# Patient Record
Sex: MALE | Race: WHITE | Employment: FULL TIME | URBAN - NONMETROPOLITAN AREA
[De-identification: names, ages, dates, MRNs, and addresses within clinical notes are randomized per-mention and may not be internally consistent; named-entity substitution may affect disease eponyms.]

---

## 2020-01-03 ENCOUNTER — HOSPITAL ENCOUNTER (EMERGENCY)
Age: 45
Discharge: HOME | End: 2020-01-03
Payer: COMMERCIAL

## 2020-01-03 VITALS
RESPIRATION RATE: 16 BRPM | HEART RATE: 100 BPM | TEMPERATURE: 98.6 F | SYSTOLIC BLOOD PRESSURE: 135 MMHG | OXYGEN SATURATION: 97 % | WEIGHT: 269 LBS | BODY MASS INDEX: 35.65 KG/M2 | HEIGHT: 73 IN | DIASTOLIC BLOOD PRESSURE: 85 MMHG

## 2020-01-03 PROCEDURE — 99203 OFFICE O/P NEW LOW 30 MIN: CPT | Performed by: NURSE PRACTITIONER

## 2020-01-03 PROCEDURE — 99202 OFFICE O/P NEW SF 15 MIN: CPT

## 2020-01-03 RX ORDER — PREDNISONE 20 MG/1
40 TABLET ORAL DAILY
Qty: 10 TABLET | Refills: 0 | Status: SHIPPED | OUTPATIENT
Start: 2020-01-03 | End: 2020-01-08

## 2020-01-03 RX ORDER — AMOXICILLIN AND CLAVULANATE POTASSIUM 875; 125 MG/1; MG/1
1 TABLET, FILM COATED ORAL 2 TIMES DAILY
Qty: 14 TABLET | Refills: 0 | Status: SHIPPED | OUTPATIENT
Start: 2020-01-03 | End: 2020-01-10

## 2020-01-03 ASSESSMENT — PAIN SCALES - GENERAL: PAINLEVEL_OUTOF10: 8

## 2020-01-03 ASSESSMENT — ENCOUNTER SYMPTOMS
COUGH: 1
SHORTNESS OF BREATH: 0
SINUS PRESSURE: 1
DIARRHEA: 0
VOMITING: 1
SORE THROAT: 1
NAUSEA: 1

## 2020-01-03 ASSESSMENT — PAIN DESCRIPTION - LOCATION: LOCATION: BACK;HEAD

## 2020-01-03 ASSESSMENT — PAIN DESCRIPTION - ORIENTATION: ORIENTATION: LOWER

## 2020-01-03 NOTE — ED NOTES
Pt verbalized discharge instructions. Pt informed to go to ER if develop chest pain, shortness of breath or abdominal pain. Pt ambulatory out in stable condition. Assessment unchanged.        Vania Marin RN  01/03/20 0219

## 2020-01-03 NOTE — ED TRIAGE NOTES
To room 1 with son c/o cough nasal congestion. Headache, back pain, dizziness and emesis x 2 today.   Reports \" Other famuly members  Have had similar symptoms\"

## 2020-01-03 NOTE — ED PROVIDER NOTES
[facility]  Urgent Care Encounter       CHIEF COMPLAINT       Chief Complaint   Patient presents with    Nasal Congestion     headache, back pain, dizzy    Emesis     x 2 today       Nurses Notes reviewed and I agree except as noted in the HPI. HISTORY OF PRESENT ILLNESS   Dennis Espinoza is a 53 y.o. male who presents with complaints of nasal congestion and cough. Symptoms have been present for the past 4 days. They originally started on his way back from [place] after visiting his family. He states his in-laws were ill at the time. Patient is reporting sore throat off and on and mild dizziness today. He also reports chills and body aches. He did have some vomiting today as well. He is also reporting a frontal headache. The history is provided by the patient. REVIEW OF SYSTEMS     Review of Systems   Constitutional: Positive for appetite change, chills and fever. HENT: Positive for congestion, postnasal drip, sinus pressure and sore throat. Negative for ear pain. Respiratory: Positive for cough. Negative for shortness of breath. Cardiovascular: Negative for chest pain. Gastrointestinal: Positive for nausea and vomiting. Negative for diarrhea. Musculoskeletal: Positive for myalgias. Skin: Negative for rash. Neurological: Positive for headaches. PAST MEDICAL HISTORY         Diagnosis Date    Back ache     Head ache        SURGICALHISTORY     Patient  has a past surgical history that includes Cervical discectomy and Appendectomy. CURRENT MEDICATIONS       Previous Medications    CELECOXIB (CELEBREX PO)    Take 200 mg by mouth     DULOXETINE HCL PO    Take 30 mg by mouth     PSEUDOEPHEDRINE-APAP-DM (DAYQUIL PO)    Take by mouth    VERAPAMIL HCL ER PO    Take 80 mg by mouth        ALLERGIES     Patient is has No Known Allergies. Patients   There is no immunization history on file for this patient.     FAMILY HISTORY     Patient's family history Mental Status: He is alert and oriented to person, place, and time. Sensory: No sensory deficit. Comments: Answers questions readily and appropriately   Psychiatric:         Mood and Affect: Mood normal.         Speech: Speech normal.         Behavior: Behavior normal. Behavior is cooperative. DIAGNOSTIC RESULTS     Labs:No results found for this visit on 01/03/20. IMAGING:    No orders to display         URGENT CARE COURSE:     Vitals:    01/03/20 1714   BP: 135/85   Pulse: 100   Resp: 16   Temp: 98.6 °F (37 °C)   SpO2: 97%   Weight: 269 lb (122 kg)   Height: 6' 1\" (1.854 m)       Medications - No data to display         PROCEDURES:  None    FINAL IMPRESSION      1. Acute bacterial sinusitis          DISPOSITION/ PLAN     Patient be treated for acute sinusitis. Treat with Augmentin and prednisone burst.  Can use OTC meds as desired for symptom management. Follow-up in 1 week if not improved with treatment. Further instructions were outlined verbally and in the patient's discharge instructions. All the patient's questions were answered. The patient/parent agreed with the plan and was discharged from the [facility] in good condition. PATIENT REFERRED TO:  No primary care provider on file. No primary physician on file.       DISCHARGE MEDICATIONS:  New Prescriptions    AMOXICILLIN-CLAVULANATE (AUGMENTIN) 875-125 MG PER TABLET    Take 1 tablet by mouth 2 times daily for 7 days    PREDNISONE (DELTASONE) 20 MG TABLET    Take 2 tablets by mouth daily for 5 days       Discontinued Medications    No medications on file       Current Discharge Medication List          CAIO Jordan CNP    (Please note that portions of this note were completed with a voice recognition program. Efforts were made to edit the dictations but occasionally words are mis-transcribed.)         CAIO Jordan CNP  01/03/20 8175